# Patient Record
Sex: FEMALE | Race: WHITE | NOT HISPANIC OR LATINO | Employment: FULL TIME | URBAN - METROPOLITAN AREA
[De-identification: names, ages, dates, MRNs, and addresses within clinical notes are randomized per-mention and may not be internally consistent; named-entity substitution may affect disease eponyms.]

---

## 2023-06-05 PROBLEM — Z80.41 FAMILY HISTORY OF OVARIAN CANCER: Status: ACTIVE | Noted: 2023-06-05

## 2023-06-05 PROBLEM — Z80.3 FAMILY HISTORY OF BREAST CANCER: Status: ACTIVE | Noted: 2023-06-05

## 2023-06-05 PROBLEM — N63.20 MASS OF LEFT BREAST: Status: ACTIVE | Noted: 2023-06-05

## 2023-06-05 NOTE — PROGRESS NOTES
[facility]  Shaw Maddox MD, FACS  Gerson Ruiz NP-C      Chief Complaint:   Rahel Nguyen is a 47 y.o. female presenting today for her annual evaluation. She is due for an ultrasound.  She reports no interval changes.     History of Present Illness:   Mrs. Ibarra first presented on October 29, 2018 with a benign appearing left asymmetry and the recommendation was made to follow the area conservatively. Her TEENA was calculated in 2018 and found to give her a 18.8% Lifetime Risk of Breast Cancer. MD:::Miko Ledesma MD    Past Medical History:   Diagnosis Date    Family history of breast cancer 6/5/2023    Family history of ovarian cancer 6/5/2023    Mass of left breast 6/5/2023      Past Surgical History:   Procedure Laterality Date    CYST REMOVAL      WISDOM TOOTH EXTRACTION          Current Outpatient Medications:     clotrimazole-betamethasone 1-0.05% (LOTRISONE) cream, Apply topically 2 (two) times daily., Disp: 15 g, Rfl: 3    cyanocobalamin (VITAMIN B-12) 100 MCG tablet, Vitamin B-12 Take No date recorded No form recorded No frequency recorded No route recorded No set duration recorded No set duration amount recorded active No dosage strength recorded No dosage strength units of measure recorded, Disp: , Rfl:     ergocalciferol (ERGOCALCIFEROL) 50,000 unit Cap, Vitamin D2 Take No date recorded No form recorded No frequency recorded No route recorded No set duration recorded No set duration amount recorded active No dosage strength recorded No dosage strength units of measure recorded, Disp: , Rfl:     zinc sulfate (ZINC-15) 66 mg Tab, zinc Take No date recorded No form recorded No frequency recorded No route recorded No set duration recorded No set duration amount recorded active No dosage strength recorded No dosage strength units of measure recorded, Disp: , Rfl:     DEXBROMPHENIRAMN-CHLOPHEDIANOL ORAL, 7.5 mLs., Disp: , Rfl:      DEXBROMPHENIRAMN-CHLOPHEDIANOL ORAL, Chlo Hist Take 7.5 ml (oral) every 6 hours for 5 days [id number] solution every 6 hours oral 5 days suspended 1-12.5 mg/5 mL, Disp: , Rfl:     dicyclomine (BENTYL) 20 mg tablet, Bentyl Take 1 tablet (oral) 3 times per day prn abdominal cramps [id number] tablet 3 times per day oral No set duration recorded No set duration amount recorded suspended 20 MG, Disp: , Rfl:     guaifen/dextromethorphan/PE (PHENYLEPHRINE-DM-GUAIFENESIN ORAL), Aquanaz Take No date recorded No form recorded No frequency recorded No route recorded No set duration recorded No set duration amount recorded active No dosage strength recorded No dosage strength units of measure recorded, Disp: , Rfl:     ondansetron (ZOFRAN-ODT) 8 MG TbDL, ondansetron Take 1 tablet (Oral) every 8 hours PRN - Nausea [id number] tablet,disintegrating every 8 hours Oral No set duration recorded No set duration amount recorded suspended 8 mg, Disp: , Rfl:     PROMETHEGAN 50 mg suppository, Place 50 mg rectally every 6 (six) hours as needed., Disp: , Rfl:    Review of patient's allergies indicates:   Allergen Reactions    Buckwheat Hives, Itching, Nausea And Vomiting and Swelling    Codeine      Hyper    Gluten     Gluten protein Diarrhea and Itching    Lanolin Rash     Cream- break out in hives       Milk Diarrhea      Social History     Tobacco Use    Smoking status: Never    Smokeless tobacco: Never   Substance Use Topics    Alcohol use: Yes      Family History   Problem Relation Age of Onset    Diabetes Father     Breast cancer Maternal Aunt 59    Ovarian cancer Paternal Grandmother 76        Review of Systems   Integumentary:  Negative for color change, rash, mole/lesion, breast mass, breast discharge and breast tenderness.   Breast: Negative for mass and tenderness     Physical Exam   HENT:   Head: Normocephalic.   Pulmonary/Chest: Right breast exhibits no inverted nipple, no mass, no nipple discharge, no skin change and no tenderness.  Left breast exhibits no inverted nipple, no mass, no nipple discharge, no skin change and no tenderness. No breast swelling.   Genitourinary: No breast swelling.   Musculoskeletal: Lymphadenopathy:      Upper Body:      Right upper body: No supraclavicular or axillary adenopathy.      Left upper body: No supraclavicular or axillary adenopathy.     Neurological: She is alert.        ULTRASOUND EVALUATION and REPORT    Bilateral real-time Ultrasound was performed by me.  All four quadrants of the breast, the subareolar and axillary basins were scanned.    She has some heterogeneous dense fibroglandular tissue bilaterally.    Right Breast: She has normal tissues in the right breast; there's no disruption of the tissue planes and no abnormal shadowing; she has normal skin thickness with no subcutaneous nodules of skin thickening; NEM     Left Breast: She has normal tissues in the left breast; there's no disruption of the tissue planes and no abnormal shadowing; she has normal skin thickness with no subcutaneous nodules or skin thickening; NEM     Axillae: There are no abnormal lymph nodes seen bilaterally.     Impression: Some dense but normal tissue bilaterally with no solid/suspicious masses noted. No LAD in bilateral axillae.  BIRADS: Category 2 - Benign Finding.    Findings were discussed with patient in real time and a hand written report was given to her at the conclusion of the exam.      ASSESSMENT and PLAN OF CARE     1. Mass of left breast, unspecified quadrant  Assessment & Plan:  We reviewed our findings today and her questions were answered.  She understands that her imaging and exams have remained stable (and show nothing concerning).  She is comfortable being followed in a conservative fashion.      She understands the importance of monthly self-breast examination and knows to report any and all changes as they occur.        2. Family history of breast cancer  Assessment & Plan:  We discussed her family  history and how it could impact her own future risks.  We discussed family vs. genetic history and the importance and implications of each.  Genetic Counseling/Testing was offered, and all questions answered to her satisfaction.  She knows that as additional family members are diagnosed - she will need to let us know as this may change follow up and imaging recommendations.        3. Family history of ovarian cancer  Assessment & Plan:  Same as above.      Medical Decision Making: US - It is my impression that the patient suffers from all the listed conditions.  Each of these conditions did affect my Plan of Care and all medical decisions that were rendered.  The medical decision making was of high difficulty based on her co-morbidities and her previous diagnosis of being a High-Risk Patient given her personal and family histories.   I have performed and reviewed all imaging and it has been discussed with her. I have reviewed and verified her allergies, list of medications, medical and surgical histories, social history, and a pertinent review of symptoms.     Follow up: 1 year and prn     For:  PE and US with me

## 2023-06-19 ENCOUNTER — OFFICE VISIT (OUTPATIENT)
Dept: SURGERY | Facility: CLINIC | Age: 48
End: 2023-06-19
Payer: COMMERCIAL

## 2023-06-19 DIAGNOSIS — N63.20 MASS OF LEFT BREAST, UNSPECIFIED QUADRANT: Primary | ICD-10-CM

## 2023-06-19 DIAGNOSIS — Z80.41 FAMILY HISTORY OF OVARIAN CANCER: ICD-10-CM

## 2023-06-19 DIAGNOSIS — Z80.3 FAMILY HISTORY OF BREAST CANCER: ICD-10-CM

## 2023-06-19 PROCEDURE — 1159F MED LIST DOCD IN RCRD: CPT | Mod: CPTII,S$GLB,, | Performed by: SPECIALIST

## 2023-06-19 PROCEDURE — 1160F PR REVIEW ALL MEDS BY PRESCRIBER/CLIN PHARMACIST DOCUMENTED: ICD-10-PCS | Mod: CPTII,S$GLB,, | Performed by: SPECIALIST

## 2023-06-19 PROCEDURE — 1159F PR MEDICATION LIST DOCUMENTED IN MEDICAL RECORD: ICD-10-PCS | Mod: CPTII,S$GLB,, | Performed by: SPECIALIST

## 2023-06-19 PROCEDURE — 1160F RVW MEDS BY RX/DR IN RCRD: CPT | Mod: CPTII,S$GLB,, | Performed by: SPECIALIST

## 2023-06-19 PROCEDURE — 99214 PR OFFICE/OUTPT VISIT, EST, LEVL IV, 30-39 MIN: ICD-10-PCS | Mod: S$GLB,,, | Performed by: SPECIALIST

## 2023-06-19 PROCEDURE — 99214 OFFICE O/P EST MOD 30 MIN: CPT | Mod: S$GLB,,, | Performed by: SPECIALIST

## 2024-06-03 NOTE — PROGRESS NOTES
Date of Service: 6/24/2024    Chief Complaint:   Rahel Nguyen is a 48 y.o. female presenting today for her annual evaluation.  She is due for her annual ultrasound. She reports no interval changes.     History of Present Illness: Mrs. Ibarra first presented on October 29, 2018 with a benign appearing left asymmetry and the recommendation was made to follow the area conservatively. Her TEENA was calculated in 2018 and found to give her a 18.8% Lifetime Risk of Breast Cancer. MD:::Miko Ledesma MD      Past Medical History:   Diagnosis Date    Family history of breast cancer 6/5/2023    Family history of ovarian cancer 6/5/2023    Mass of left breast 6/5/2023      Past Surgical History:   Procedure Laterality Date    CYST REMOVAL      WISDOM TOOTH EXTRACTION          Current Outpatient Medications:     azelastine (ASTELIN) 137 mcg (0.1 %) nasal spray, 1 spray by Nasal route 2 (two) times daily., Disp: , Rfl:     cetirizine (ZYRTEC) 10 MG tablet, Take 1 tablet by mouth every morning., Disp: , Rfl:     clotrimazole-betamethasone 1-0.05% (LOTRISONE) cream, Apply topically 2 (two) times daily., Disp: 45 g, Rfl: 2    cyanocobalamin (VITAMIN B-12) 100 MCG tablet, Vitamin B-12 Take No date recorded No form recorded No frequency recorded No route recorded No set duration recorded No set duration amount recorded active No dosage strength recorded No dosage strength units of measure recorded, Disp: , Rfl:     DEXBROMPHENIRAMN-CHLOPHEDIANOL ORAL, 7.5 mLs., Disp: , Rfl:     DEXBROMPHENIRAMN-CHLOPHEDIANOL ORAL, Chlo Hist Take 7.5 ml (oral) every 6 hours for 5 days [id number] solution every 6 hours oral 5 days suspended 1-12.5 mg/5 mL, Disp: , Rfl:     dicyclomine (BENTYL) 20 mg tablet, Bentyl Take 1 tablet (oral) 3 times per day prn abdominal cramps [id number] tablet 3 times per day oral No set duration recorded No set duration amount recorded suspended 20 MG, Disp: , Rfl:     ergocalciferol (ERGOCALCIFEROL)  50,000 unit Cap, Vitamin D2 Take No date recorded No form recorded No frequency recorded No route recorded No set duration recorded No set duration amount recorded active No dosage strength recorded No dosage strength units of measure recorded, Disp: , Rfl:     guaifen/dextromethorphan/PE (PHENYLEPHRINE-DM-GUAIFENESIN ORAL), Aquanaz Take No date recorded No form recorded No frequency recorded No route recorded No set duration recorded No set duration amount recorded active No dosage strength recorded No dosage strength units of measure recorded, Disp: , Rfl:     ondansetron (ZOFRAN-ODT) 8 MG TbDL, ondansetron Take 1 tablet (Oral) every 8 hours PRN - Nausea [id number] tablet,disintegrating every 8 hours Oral No set duration recorded No set duration amount recorded suspended 8 mg, Disp: , Rfl:     PROMETHEGAN 50 mg suppository, Place 50 mg rectally every 6 (six) hours as needed., Disp: , Rfl:     zinc sulfate (ZINC-15) 66 mg Tab, zinc Take No date recorded No form recorded No frequency recorded No route recorded No set duration recorded No set duration amount recorded active No dosage strength recorded No dosage strength units of measure recorded, Disp: , Rfl:    Review of patient's allergies indicates:   Allergen Reactions    Buckwheat Hives, Itching, Nausea And Vomiting and Swelling    Codeine      Hyper    Gluten     Gluten protein Diarrhea and Itching    Lanolin Rash     Cream- break out in hives       Milk Diarrhea      Social History     Tobacco Use    Smoking status: Never    Smokeless tobacco: Never   Substance Use Topics    Alcohol use: Yes     Alcohol/week: 2.0 standard drinks of alcohol     Types: 2 Glasses of wine per week     Comment: occasional      Family History   Problem Relation Name Age of Onset    Lung cancer Paternal Grandfather      Ovarian cancer Paternal Grandmother Sandra 76    Heart failure Maternal Grandmother Beaula     Lung cancer Maternal Grandfather      Diabetes Father Buster     Arrhythmia  Mother      Asthma Brother Kiel     Breast cancer Maternal Aunt Desire Zee        Review of Systems   Integumentary:  Negative for color change, rash, mole/lesion, thickening/swelling, dimpling of skin, drainage  Breast: Negative for mass and tenderness     Physical Exam   Constitutional: She appears well-developed. She is cooperative.   HENT: Normocephalic.   Cardiovascular:  Normal rate and regular rhythm.            Pulmonary/Chest: She exhibits no tenderness and no bony tenderness.   Abdominal: Soft. Normal appearance.   Musculoskeletal: Intact with no deficits  Neurological: She is alert.   Skin: No rash noted.   Breast and Chest Wall Evaluation:   Right breast exhibits no mass, no nipple discharge, no skin change and no tenderness.     Left breast exhibits no mass, no nipple discharge, no skin change and no tenderness.     Lymphadenopathy: No supraclavicular or axillary adenopathy.    ULTRASOUND EVALUATION and REPORT    Bilateral real-time Ultrasound was performed by me.  All four quadrants of the breast, the subareolar and axillary basins were scanned.    She has some heterogeneous dense fibroglandular tissue bilaterally.  We discussed  this dense tissue and its potential implications.    Right Breast: She has normal tissues in the right breast; there's no disruption of the tissue planes and no abnormal shadowing; she has normal skin thickness with no subcutaneous nodules of skin thickening; NEM     Left Breast: She has normal tissues in the left breast; there's no disruption of the tissue planes and no abnormal shadowing; she has normal skin thickness with no subcutaneous nodules or skin thickening; NEM     Axillae: There are no abnormal lymph nodes seen bilaterally.     Impression: Some dense but normal tissue bilaterally with no solid/suspicious masses noted. No LAD in bilateral axillae.      BIRADS: Category 2 - Benign Finding.    Findings were discussed with patient in real time and a hand written report  was given to her at the conclusion of the exam.      ASSESSMENT and PLAN OF CARE     1. Mass of left breast, unspecified quadrant  Assessment & Plan:  We reviewed our findings today and her questions were answered.  She understands that her imaging and exams have remained stable (and show nothing concerning).  She is comfortable being followed in a conservative fashion.      She understands the importance of monthly self-breast examination and knows to report any and all changes as they occur.    NOTE:::We viewed her films together at today's visit.  We discussed the multiple views obtained and the important findings.  Even benign changes were mentioned and her questions were answered.  She is to contact us if she has questions.        2. Family history of breast cancer  Assessment & Plan:  We discussed her family history and how it could impact her own future risks.  We discussed family vs. genetic history and the importance and implications of each.  Genetic Counseling/Testing was offered, and all questions answered to her satisfaction.  She knows that as additional family members are diagnosed - she will need to let us know as this may change follow up and imaging recommendations.    We had a discussion concerning Breast Cancer Risk Reduction and current NCCN Guidelines. She knows that her risk can be lowered slightly with a healthy lifestyle and minimal ETOH use. Being physically active will also help. She should reduce or stay away from OCPs and HRT as possible.         Medical Decision Making: It is my impression that the patient suffers from all the listed conditions.  Each of these conditions did affect my Plan of Care and all medical decisions that were rendered.  The medical decision making was of high difficulty based on her co-morbidities and her previous diagnosis of being a High-Risk Patient given her personal and family histories.   I have performed and reviewed all imaging and it has been discussed  with her. I have reviewed and verified her allergies, list of medications, medical and surgical histories, social history, and a pertinent review of symptoms.     Follow up: 1 year and PRN    For: PE and Ultrasound with me

## 2024-06-24 ENCOUNTER — OFFICE VISIT (OUTPATIENT)
Dept: SURGERY | Facility: CLINIC | Age: 49
End: 2024-06-24
Payer: COMMERCIAL

## 2024-06-24 DIAGNOSIS — N63.20 MASS OF LEFT BREAST, UNSPECIFIED QUADRANT: Primary | ICD-10-CM

## 2024-06-24 DIAGNOSIS — Z80.3 FAMILY HISTORY OF BREAST CANCER: ICD-10-CM

## 2024-06-24 PROCEDURE — 1160F RVW MEDS BY RX/DR IN RCRD: CPT | Mod: CPTII,S$GLB,, | Performed by: SPECIALIST

## 2024-06-24 PROCEDURE — 99214 OFFICE O/P EST MOD 30 MIN: CPT | Mod: S$GLB,,, | Performed by: SPECIALIST

## 2024-06-24 PROCEDURE — 99999 PR PBB SHADOW E&M-EST. PATIENT-LVL III: CPT | Mod: PBBFAC,,, | Performed by: SPECIALIST

## 2024-06-24 PROCEDURE — 1159F MED LIST DOCD IN RCRD: CPT | Mod: CPTII,S$GLB,, | Performed by: SPECIALIST

## 2025-04-10 ENCOUNTER — PATIENT MESSAGE (OUTPATIENT)
Dept: SURGERY | Facility: CLINIC | Age: 50
End: 2025-04-10
Payer: COMMERCIAL